# Patient Record
Sex: MALE | Race: WHITE | Employment: OTHER | URBAN - METROPOLITAN AREA
[De-identification: names, ages, dates, MRNs, and addresses within clinical notes are randomized per-mention and may not be internally consistent; named-entity substitution may affect disease eponyms.]

---

## 2021-01-26 ENCOUNTER — TRANSFERRED RECORDS (OUTPATIENT)
Dept: HEALTH INFORMATION MANAGEMENT | Facility: CLINIC | Age: 76
End: 2021-01-26

## 2021-02-04 ENCOUNTER — MEDICAL CORRESPONDENCE (OUTPATIENT)
Dept: HEALTH INFORMATION MANAGEMENT | Facility: CLINIC | Age: 76
End: 2021-02-04

## 2021-02-05 ENCOUNTER — REFERRAL (OUTPATIENT)
Dept: TRANSPLANT | Facility: CLINIC | Age: 76
End: 2021-02-05

## 2021-02-05 DIAGNOSIS — N18.6 END STAGE RENAL DISEASE (H): ICD-10-CM

## 2021-02-05 DIAGNOSIS — N18.6 ESRD (END STAGE RENAL DISEASE) (H): Primary | ICD-10-CM

## 2021-02-05 DIAGNOSIS — I25.10 CARDIOVASCULAR DISEASE: ICD-10-CM

## 2021-02-05 DIAGNOSIS — Z01.818 PRE-TRANSPLANT EVALUATION FOR KIDNEY TRANSPLANT: ICD-10-CM

## 2021-02-05 NOTE — LETTER
Mike Dunne  [address] [address]   1945              February 10, 2021                                                                                      MEDICAL RECORDS REQUEST    [facility] Records Request                      Facility: [facility]    Thank you for referring your patient to the [facility], in order to process the referral we will need the following information;    1. 2728 form   2. Immunizations records  3. Providers Progress notes, (last 3 note)      Please call our office at [phone] if you have any questions or concerns.                Please fax all paper records to [phone] within 3-5 business days.      Thank you,   The SOT Referral Intake Team     [facility]  Solid Organ Transplant Office  [address] [place] [address], [address] [address]

## 2021-02-05 NOTE — LETTER
February 11, 2021      Mike Dunne  [address] [facility] [address]          Dear Mike,    Thank you for your interest in the Transplant Center at [facility]. We look forward to being a part of your care team and assisting you through the transplant process.    As we discussed, your transplant coordinator is Lilian Ryder (Kidney).  You may call your coordinator at any time with questions or concerns.  Your first scheduled call will be on February 22, 2021.  If this needs to change, call [phone].    Please complete the following.    1. Fill out and return the enclosed forms    Authorization for Electronic Communication    Authorization to Discuss Protected Health Information    Authorization for Release of Protected Health Information    Authorization for Care Everywhere Release of Information    2. Sign up for:    [employer], access to your electronic medical record (see enclosed pamphlet)    [employer], a transplant education website    You can use these tools to learn more about your transplant, communicate with your care team, and track your medical details  Sincerely,  Solid Organ Transplant  [facility]  Cc: Jay Darden (PCP) Angela Joshi (Referring) Ramin Terry MD

## 2021-02-05 NOTE — Clinical Note
Hello! Please see orders for PKE. No save the date set but he'd like to come in on a Monday. Thank you!

## 2021-02-10 VITALS — BODY MASS INDEX: 22.9 KG/M2 | WEIGHT: 160 LBS | HEIGHT: 70 IN

## 2021-02-10 SDOH — HEALTH STABILITY: MENTAL HEALTH: HOW OFTEN DO YOU HAVE A DRINK CONTAINING ALCOHOL?: NOT ASKED

## 2021-02-10 SDOH — HEALTH STABILITY: MENTAL HEALTH: HOW OFTEN DO YOU HAVE 6 OR MORE DRINKS ON ONE OCCASION?: NOT ASKED

## 2021-02-10 SDOH — HEALTH STABILITY: MENTAL HEALTH: HOW MANY STANDARD DRINKS CONTAINING ALCOHOL DO YOU HAVE ON A TYPICAL DAY?: NOT ASKED

## 2021-02-10 ASSESSMENT — MIFFLIN-ST. JEOR: SCORE: 1467.01

## 2021-02-10 NOTE — TELEPHONE ENCOUNTER
PCP: Jay Darden   Referring Provider: Angela Shabazz  Referring Diagnosis: ESRD  Specialist: Hematologist Ramin Terry- Amyloidosis  Sees Cardiology at [facility]- no specific one per patient    Is patient under the age of 65? N  Is patient diabetic? N  Is patient on insulin? N  Was patient offered a pancreas transplant referral? N    Is patient in a group home/assisted living? n  Does patient have a guardian? n    Patient stated he was evaluated at [facility] and declined due to heart.    Referral intake process completed.  Patient is aware that after financial approval is received, medical records will be requested.   Patient confirmed for a callback from transplant coordinator on February 22, 2021. (within 2 weeks)  Tentative evaluation date TBD-not scheduled. (within 4 weeks)    Confirmed coordinator will discuss evaluation process in more detail at the time of their call.   Patient is aware of the need to arrange age appropriate cancer screening, vaccinations, and dental care.  Reminded patient to complete questionnaire, complete medical records release, and review packet prior to evaluation visit .  Assessed patient for special needs (ie--wheelchair, assistance, guardian, and ):  uses cane prn   Patient instructed to call [phone] with questions.     Patient gave verbal consent during intake call to obtain medical records and documents outside of [facility]:  yes

## 2021-02-22 NOTE — TELEPHONE ENCOUNTER
"Reviewed pt's chart for pre-kidney transplant evaluation planning. Pt lives in [place], [place]. Pt has AL amyloidosis. Patient reports a biopsy was completed of his kidney- no records found. Pt is on hemo dialysis; started in 2015. He tried PD but recently had the catheter removed. Pt is not diabetic. Other hx includes knee replacemement x2, hip replacement x1.  Heart hx is significant d/t cardiac involvement with AL amyloidosis. In 2018 he had a R and Left heart cath d/t EF of 30%, recovered to 60%. 2019- stent placement d/t severe CAD, follow-up angiogram showed mild-moderate CAD with patent stents. Underwent TAVR for severe aortic stenosis in 2020 and is also s/p ablation for SVT. Patient does not consider himself to have any cardiac issues and feels that he has recovered. Lung status no history of chronic infections or oxygen use.  BMI 25 on 1/25/2021. Dental: encouraged to stay up to date. Pt is not a  smoker, does not consume alcohol, and refrains from recreational drugs. Pt uses a cane for ambulation, and is hoping with increased exercise he will require it less. Noted to be \"pre-frail\".  Pt lives w/ wife and has her support following transplant. Patient was recently declined by [facility] due to high risk with cardiac history and co-morbidities.    I also introduced [employer] and asked pt to create an account and view pre-kidney transplant videos for review with me following evaluation. Informed pt they will hear from scheduling to arrange the evaluation. Smartset orders entered, chart routed to scheduling pool.       "

## 2021-03-10 NOTE — TELEPHONE ENCOUNTER
Spoke with Seun regarding PKE. He called and asked to be put on the kidney transplant wait list, and I reminded him that he would need to see providers and be approved. He did not remember that he scheduled PKE for next week, but stated he was available. RNCC reached out to scheduling team and they will mail a schedule to him ASAP. RNCC reviewed PKE with the patient and prepared them for a long day at the clinic. Patient was agreeable to this and would call with questions.

## 2021-03-11 ENCOUNTER — TELEPHONE (OUTPATIENT)
Dept: TRANSPLANT | Facility: CLINIC | Age: 76
End: 2021-03-11

## 2021-03-11 NOTE — TELEPHONE ENCOUNTER
Spoke with patient and confirmed upcoming PKE appointments 3/15/21 starting at 7:30 am. Patient instructed he may eat breakfast, take regularly scheduled medications and have 1 adult visitor accompany him. Patient denies Covid 19 symptoms and/or exposure within 14 days and will call to reschedule if that changes.  Patient stated understanding.  Patient given Transplant Office phone number number for further questions/concerns.

## 2021-03-13 NOTE — PROGRESS NOTES
[facility] Solid Organ Transplant  Outpatient MNT: Kidney Transplant Evaluation    Current BMI: 25.2 (HT 69 in,  lbs/77 kg)  BMI is within recommendation of <35 for kidney transplant    Frailty Assessment -- Frail (3/5)--exhaustion, reduced , slowness          Time Spent: 15 minutes  Visit Type: Initial   Referring Physician: Rosalio  Pt accompanied by: his wife, Shashi     History of previous txp: none   Dialysis: yes    Dialysis Info: HD 3/2015 9 am TTS  Protein supplement: yes, protein bar    Nutrition Assessment  Wife cooks at home.     Appetite: good/baseline    Vitamins, Supplements, Pertinent Meds: B complex, vit D, vit B12  Herbal Medicines/Supplements: OTC for mental acuity x 1 month (pt unsure of name)    Edema: yes    Weight hx: overall stable     Food Security: any concerns about having enough money to buy food or access to grocery stores? No     Diet Recall  Breakfast During HD- turkey with cheese s/w, protein bar; non-HD days: english muffin/toast/scrambled egg   Lunch Turkey s/w    Dinner Homemade soup (with LS broth); protein (chicken) + salad, some side (white rice/baked potato)   Snacks Salty snack- chips/popcorn    Beverages Water, coffee, Pedialyte (8-16 oz)   Alcohol None    Dining out 1x/week- turkey hoagie with lettuce      Physical Activity  None       Labs  2/18 K 4.9   No recent phos on file -- high per pt report     Nutrition Diagnosis  No nutrition diagnosis identified at this time     Nutrition Intervention  Nutrition education provided:  Discussed sodium intake (low sodium foods and drinks, seasoning food without salt and tips for low sodium diet).  Reviewed monitoring sodium for 2000 mg/day. Ask for LS turkey at the deli counter or compare prepackaged brands. Reviewed wnl K level, yet elevated phos per his report. Could be from added phosphates in turkey as well. Not on binder yet, but may start one pending lab trends.     We discussed considering PT to help with strength  and ambulation. He did find it helpful in the past, but it closed down with COVID so he had to stop going. Is open to going back.     Reviewed post txp diet guidelines in brief (will review in further detail post txp):  (1) Review of proper food safety measures d/t immunosuppressant therapy post-op and increased risk for food-borne illness    (2) Avoid the following post txp d/t risk for rejection, unknown effects on the organs, and/or potential interactions with immunosuppressants:  - Herbal, Chinese, holistic, chiropractic, natural, alternative medicines and supplements  - Detoxes and cleanses  - Weight loss pills  - Protein powders or other products with extracts or herbs (ie green tea extract)    (3) Med regimen and possible side effects    Patient Understanding: Pt verbalized understanding of education provided.  Expected Engagement: Good  Follow-Up Plans: PRN     Nutrition Goals  No nutrition goals identified at this time     Nadege Hernandez, RD, LD, CCTD

## 2021-03-15 ENCOUNTER — DOCUMENTATION ONLY (OUTPATIENT)
Dept: TRANSPLANT | Facility: CLINIC | Age: 76
End: 2021-03-15

## 2021-03-15 ENCOUNTER — ALLIED HEALTH/NURSE VISIT (OUTPATIENT)
Dept: TRANSPLANT | Facility: CLINIC | Age: 76
End: 2021-03-15
Attending: INTERNAL MEDICINE
Payer: MEDICARE

## 2021-03-15 VITALS
DIASTOLIC BLOOD PRESSURE: 87 MMHG | OXYGEN SATURATION: 95 % | WEIGHT: 170.64 LBS | SYSTOLIC BLOOD PRESSURE: 177 MMHG | BODY MASS INDEX: 25.27 KG/M2 | HEART RATE: 87 BPM | HEART RATE: 87 BPM | OXYGEN SATURATION: 95 % | HEIGHT: 69 IN | SYSTOLIC BLOOD PRESSURE: 177 MMHG | DIASTOLIC BLOOD PRESSURE: 87 MMHG | BODY MASS INDEX: 25.27 KG/M2 | HEIGHT: 69 IN | WEIGHT: 170.6 LBS

## 2021-03-15 DIAGNOSIS — I25.10 CARDIOVASCULAR DISEASE: ICD-10-CM

## 2021-03-15 DIAGNOSIS — N18.6 ESRD (END STAGE RENAL DISEASE) (H): ICD-10-CM

## 2021-03-15 DIAGNOSIS — Z95.2 S/P TAVR (TRANSCATHETER AORTIC VALVE REPLACEMENT): ICD-10-CM

## 2021-03-15 DIAGNOSIS — N18.6 END STAGE RENAL DISEASE (H): ICD-10-CM

## 2021-03-15 DIAGNOSIS — Z01.818 PRE-TRANSPLANT EVALUATION FOR KIDNEY TRANSPLANT: ICD-10-CM

## 2021-03-15 DIAGNOSIS — E85.81 LIGHT CHAIN (AL) AMYLOIDOSIS (H): Primary | ICD-10-CM

## 2021-03-15 DIAGNOSIS — I25.10 CORONARY ARTERY DISEASE INVOLVING NATIVE HEART WITHOUT ANGINA PECTORIS, UNSPECIFIED VESSEL OR LESION TYPE: ICD-10-CM

## 2021-03-15 DIAGNOSIS — I42.5 OTHER RESTRICTIVE CARDIOMYOPATHY (H): ICD-10-CM

## 2021-03-15 PROCEDURE — 99205 OFFICE O/P NEW HI 60 MIN: CPT

## 2021-03-15 PROCEDURE — 99207 PR SATISFY VISIT NUMBER: CPT | Performed by: TRANSPLANT SURGERY

## 2021-03-15 RX ORDER — WARFARIN SODIUM 1 MG/1
TABLET ORAL
COMMUNITY

## 2021-03-15 RX ORDER — CINACALCET 30 MG/1
30 TABLET, FILM COATED ORAL
COMMUNITY
Start: 2020-07-15

## 2021-03-15 RX ORDER — WARFARIN SODIUM 2 MG/1
2 TABLET ORAL
COMMUNITY
Start: 2020-07-02

## 2021-03-15 RX ORDER — METOPROLOL TARTRATE 100 MG
100 TABLET ORAL 2 TIMES DAILY
COMMUNITY

## 2021-03-15 RX ORDER — LORAZEPAM 1 MG/1
TABLET ORAL
COMMUNITY
Start: 2020-02-28

## 2021-03-15 RX ORDER — ROSUVASTATIN CALCIUM 20 MG/1
20 TABLET, COATED ORAL
COMMUNITY
Start: 2020-02-28

## 2021-03-15 RX ORDER — ACYCLOVIR 200 MG/1
200 CAPSULE ORAL 2 TIMES DAILY
COMMUNITY
Start: 2020-08-19

## 2021-03-15 RX ORDER — SENNA LEAF EXTRACT 176MG/5ML
SYRUP ORAL
COMMUNITY

## 2021-03-15 ASSESSMENT — MIFFLIN-ST. JEOR
SCORE: 1499.22
SCORE: 1499.63

## 2021-03-15 NOTE — PROGRESS NOTES
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 He has no potential living donors.     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 , hematologist suggested he may not be able to achieve deeper remission but would likely need lifelong daratumumab to maintain hematologic remission.Last Hem visit 2/2021    # Cardiac Risk: hx of CAD s/p PCI to LAD 2019 done due to positive Dobutamine stress echo with anterior, inferior, inferolateral ischemia 9/2020, LHC also showed multiple discrete lesions ~70% obstruction, repeat Dobutamine stress echo neg for ischemia 1/2021 yet with concerns for high CO-5.03L/min/m2  heart failure as CO unusually high with hx of amyloidosis, unclear if related to AVF, ef:60% improved 1/2021 from 35% in 1/2019   Last seen by [facility] 1/2021 for pre-kidney transplant evaluation, thought to be at low risk for ischemic events but high risk for acute decompensated heart failure    # Cardiomyopathy:  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 Last cardiology visit was in 1/2021 at [place], he was cleared to proceed with kidney transplant without further risk stratification. 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    # Frailty: 3/5 on frailty score, uses a cane, had multiple falls over the past year due to balance issues, will need PT    # BCC: follows with dermatology Q6 months, last visit    # Depression: seen 6/2020 by psych, not receiving counseling or  meds, appreciate social work input    # hx of shingles: last flare up ~8 months ago-facial without eye involvement-->acyclovir  on acyclovir for herpres ppx since SCT, had shingles x2; s/p shingrix vaccine per patient    # Health Maintenance: Colonoscopy: 2011 dev, due , PSA, and Dental: Up to date    Discussed the risks and benefits of a transplant, including the risk of surgery and immunosuppression medications.  Patient's overall evaluation will be discussed in the Transplant Program's regular meeting with a final  "recommendation on the patients suitability for transplant to be made at that time.    Evaluation:  Mike Dunne was seen in consultation at the request of Dr. Polo Santamaria for evaluation as a potential kidney transplant recipient.    Reason for Visit:  Mike Dunne is a 76 year old male with ESKD from AL amyloidosi, who presents for kidney transplant evaluation.    Of note, patient was removed from  donor kidney transplant wait-list at [place] 2021 due to concerns over reduced patient/graft survival given multiple co-morbidities. He is presenting today for a second opinion regarding candidacy for kidney transplant    History of Present Illness:  This is a 75 yo male with hx of ESKD 2/2 AL amyloidosis, on RRT since early  (initially PD later switched to HD due to recurrent peritonitis \"HD: TTS via [facility] AVF , , [facility] EDW-77kg) , restrictive cardiomyopathy likely due to amyloidosis, CAD s/p PCI to LAD , severe aortic stenosis s/p TAVR 2020, AVNRT s/p ablation 2020 presents for kidney transplant evaluation.   Mr. Dunne was initially noted to have abnormal renal function on routine labs in  with worsening Cr from b/l 0.9 to 1.-->2.3 as well as nephrotic range proteinuria up to 6.5 g. further evaluation showed abnormal SPEP with monoclonal IgG lambda at 10.2 g/dl and free lambda at 0.1  g/dL in the blood with elevated free kappa light chains at 26.4, free lambda at 416.8, and an abnormal kappa/lambda ratio at 0.06.   Bone marrow showed 5-10% lambda restricted plasma cells but was negative for amyloid.  He received chemotherapy (CyBorD: [id number]), s/p auto SCT in 2014 followed by repeat chemotherapy in  and again in  but he had no further response to chemoTx, so he was switched back to daratumumab since Aug.2018, BMA/Bx 2019 showed persistently +ve lambda chain restriction with +ve congo red stain on the vessel wall, SPEP/IF +small monoclonal " lambda  but K/L has normalized since 10/2020, most recent K/L:0.76 2/18/2021. , hematologist suggested he may not be able to achieve deeper remission but would likely need lifelong daratumumab to maintain hematologic remission.Last Hem visit 2/2021.   Cardiac evaluation included DSE which was +ve ischemia, subsequent LHC showed 70% LAD stenosis s/p ANIKET 2019, initial echo in 2019 showed ef:35% which improved up to 55% on last echo 2020 and 60% on recent DSE.    He reports excesseive fatigue, low energy, frequent falls over the last year (x3). He denies any chest pain, sob, but has significant LE swelling. He is tolerating HD ok though he reports inability to get to EDW due to severe leg cramps. He denies any episodes of hypotension requiring midodrine,and his BP has been running rather on the higher side lately partly volume related,      Sensitization: no PRBC         Kidney Disease Hx:        On HD since early 2015 then switched to PD up until 8/2020 when he developed recurrent peritonitis so switched back to iHD       Kidney Disease Dx: AL Amyloidosis       Biopsy Proven: Yes; 2013 (see below)         On Dialysis: Yes, Date initiated: early 2015       Primary Nephrologist:        H/o Kidney Stones: Yes; Ca oxalate per patient, passed spontaneously        H/o Recurrent/Frequent UTI: No No      Renal Pathology: 11/2013  Crescentic and  sclerosing glomerulonephritis   LM: cellular crescents half of glomeruli  IF: +++IgG, ++C3 atypical (both kappa and lambda 1-2+ mesangium, +strong 3+lambda along capillary loops & TBM, o extraglomerular deposits, crescentic pattern concerning for fibrillary GN but fibrils more suggestive of amyloid  EM: fibrils 11 nm diameter suggestive of amyloid vs fibrillary GN  [place] red stain: equivocal (+ve 1st then negative)      BMA/Bx 2013  5-10% lambda light chain restricted plasma cells         Diabetic Hx: None           Cardiac/Vascular Disease Risk Factors:         Cardiac Risk Factors: Hypertension, CKD and Age (Male > 55, Female > 65)       Known CAD: Yes; s/p PCI to LAD 2019        Known PAD/Caludication Symptoms: Yes; CT       Known Heart Failure: HFpEF       Arrhythmia: Yes; AVNRT s/p ablation       Pulmonary Hypertension: No       Valvular Disease: Yes; severe aortic stenosis s/p TAVR, mild MS       Other: Prolonged QT    [facility] 10/2019: mild-to-moderate coronary artery disease and patent LAD stents.    RHC 10/2019: a mean RA pressure of 3, PA pressure 38/12, pulmonary capillary wedge pressure 9, cardiac index 4.05 liters/minute per meter square    1/2019 Right heart catheterization showed a PCWP 31, PAP 60/29, RAP 14.    TTE 8/2020    Final Impressions  1. Status post 26 mm Hamm Nathaly 3 transcatheter aortic valve bioprosthesis implanted via a  transfemoral approach (29-JUN-2020).  2. Aortic valve prosthesis systolic mean Doppler gradient 10 mmHg.  Cusp thickness and  excursion appear normal.  Trivial periprosthetic regurgitation (1 o'clock on SAX view).  No  prosthetic regurgitation.  3. Normal left ventricular chamber size, calculated ejection fraction 58%.  No regional wall  motion abnormalities.  4. Mildly increased concentric left ventricular wall thickness.  Strain imaging findings show a  pattern consistent with cardiac involvement by systemic amyloidosis.  5. Global averaged left ventricular longitudinal peak systolic strain is at -13 % (normal =  more negative than -18%).  6. Indeterminate left ventricular diastolic function grade due to fusion of mitral inflow E-  and A- waves, as well as tissue Doppler e' and a' waves.  LV filling is A-wave predominant,  suggesting no more than mild elevation of LV diastolic pressure.  7. Mild mitral valve regurgitation.  8. Mild tricuspid valve regurgitation.  9. Estimated right ventricular systolic pressure 34 mmHg (systolic blood pressure 125 mmHg).  10. Normal right ventricular chamber size by visual estimate.  Normal  systolic function.  11. No pericardial effusion.  12. Normal inferior vena cava size with normal inspiratory collapse (>50%).    Dobutamine stress echo 1/2021  Final Impressions  1. REST IMAGES:  2. Left ventricular cardiac index 5.03 l/min/m^2 This is unusually high in a patient with known  amyloidosis. Consider the differential diagnosis of heart failure with high cardiac output.   Note that patient has right arm AV fistula.  3. Calcific mitral valve stenosis with a resting mean gradient of 8 mmHg at a heart rate of 90.  The gradient has increased compared to transthoracic study off 06/11/2020. Probably a  combination of increased cardiac index, higher heart rate and mild degree of impingement of the  Nathaly stent on the anterior mitral leaflet.  Visually, stenosis does not appear severe.  4. STRESS TEST:  5. Dobutamine stress echocardiogram negative for myocardial ischemia.  6. Ejection fraction response from 60 % at rest to 65 % at peak stress.  7. Left ventricular end-systolic volume decreased with stress.  8. No regional wall motion abnormalities with stress.    [facility] 11/2019  CORONARY DIAGNOSTIC SUMMARY  Coronary artery dominance is right.   The left main coronary artery is 20% obstructed by a discrete lesion.   The proximal left anterior descending artery is 30% obstructed by a discrete lesion.   The middle left anterior descending artery is 40% obstructed by a discrete lesion.   The proximal circumflex artery is 30% obstructed by a discrete lesion.   The proximal right coronary artery is 20% obstructed by a discrete lesion    CT a/p 6/2020    1. Scattered areas of mild to moderate peripheral arterial disease with minimal  to mild stenosis in the common femoral and bilateral common iliac arteries  bilaterally. Please see verified arterial measurements in QREADS.    2. There is new ill-defined soft tissue nodularity/stranding in the left upper  quadrant and peritoneal carcinomatosis needs to be excluded.  "Further evaluation  with peritoneal fluid cytology and/or percutaneous sampling should be  considered.         Viral Serology Status       CMV IgG Antibody: Unknown       EBV IgG Antibody: Unknown         Volume Status/Weight:        Volume status: Markedly hypervolemic       Weight:  Acceptable BMI       BMI: Body mass index is 25.19 kg/m .         Functional Capacity/Frailty:        Uses a cane to ambulate. Sedentary, balance issues with recurrent falls last year ~x3. Scored 3/5 on frailty assessment    COVID vaccinated 3/4    Fatigue/Decreased Energy: [] No [x] Yes    Chest Pain or SOB with Exertion: [] No [x] Yes    Significant Weight Change: [x] No [] Yes    Nausea, Vomiting or Diarrhea: [] No [] Yes Occasional nausea , diarrhea   Fever, Sweats or Chills:  [x] No [] Yes    Leg Swelling [] No [x] Yes        History of Cancer: BCC    Other Significant Medical Issues: None    Review of Systems:  A comprehensive review of systems was obtained and negative, except as noted in the HPI or PMH.    Past Medical History:   Medical record was reviewed and PMH was discussed with patient and noted below.  Past Medical History:   Diagnosis Date     Hypertension      Osteoarthritis     RIGHT HIP     Pain, hip     RIGHT       Past Social History:   Past Surgical History:   Procedure Laterality Date     APPENDECTOMY       ARTHROPLASTY HIP  3/21/2012    Procedure:ARTHROPLASTY HIP; Right Total Hip Arthroplasty; Surgeon:CHAYA ROBERTS; Location:[facility] OR     ARTHROPLASTY KNEE BILATERAL      2008, 2010     ARTHROSCOPY KNEE      RIGHT     CARDIAC SURGERY  2018    [facility]     FOOT SURGERY      LEFT FOOT ????CHEILECTOMY     TRANSPLANT  2017    stem cell transplant, [place]     VALVE REPLACEMENT  2020    AVR, [facility]     Personal history of bleeding or anesthesia problems: No    Family History:  No family history on file.   Neg FHx of kidney disease, +\"heart problems\" in father in his 70s, prostate Ca in " father    Personal History:   Social History     Socioeconomic History     Marital status:      Spouse name: Not on file     Number of children: Not on file     Years of education: Not on file     Highest education level: Not on file   Occupational History     Not on file   Social Needs     Financial resource strain: Not on file     Food insecurity     Worry: Not on file     Inability: Not on file     Transportation needs     Medical: Not on file     Non-medical: Not on file   Tobacco Use     Smoking status: Former Smoker     Packs/day: 0.50     Years: 25.00     Pack years: 12.50     Types: Cigarettes     Quit date:      Years since quittin.2     Smokeless tobacco: Never Used   Substance and Sexual Activity     Alcohol use: Not Currently     Drug use: No     Sexual activity: Not on file   Lifestyle     Physical activity     Days per week: Not on file     Minutes per session: Not on file     Stress: Not on file   Relationships     Social connections     Talks on phone: Not on file     Gets together: Not on file     Attends [employer]ism service: Not on file     Active member of club or organization: Not on file     Attends meetings of clubs or organizations: Not on file     Relationship status: Not on file     Intimate partner violence     Fear of current or ex partner: Not on file     Emotionally abused: Not on file     Physically abused: Not on file     Forced sexual activity: Not on file   Other Topics Concern     Parent/sibling w/ CABG, MI or angioplasty before 65F 55M? Not Asked   Social History Narrative     Not on file     Social Hx: x-smoker quit 30 yrs ago, 3- pyrs; no alcohol, no illicits    Allergies:  Allergies   Allergen Reactions     Bupropion      Other reaction(s): Muscle Aches/Weakness     Levofloxacin Other (See Comments)     Other reaction(s): Tendonitis  Achilles tendon and muscle problems  Other reaction(s): Tendinitis (disorder), Tendonitis  Achillis  "tendonitis  tendonitis  tendonitis  tendonitis  Achillis tendonitis       Nsaids      Other reaction(s): Renal  CKD     Oxycodone Nausea and Vomiting     Tamsulosin      Other reaction(s): Gastrointestinal, GI intolerance       Medications:  Current Outpatient Medications   Medication Sig     acetaminophen (TYLENOL) 325 MG tablet Take 1-2 tablets by mouth every 6 hours as needed.     acyclovir (ZOVIRAX) 200 MG capsule Take 200 mg by mouth 2 times daily     Aspirin Buf,CaCarb-MgCarb-MgO, 81 MG TABS Take 81 mg by mouth     cholecalciferol (VITAMIN D3) 25 mcg (1000 units) capsule Take 1,000 Units by mouth     cinacalcet (SENSIPAR) 30 MG tablet Take 30 mg by mouth     LORazepam (ATIVAN) 1 MG tablet TAKE 1 TABLET(1 MG) BY MOUTH AT BEDTIME AS NEEDED FOR ANXIETY     metoprolol tartrate (LOPRESSOR) 100 MG tablet Take 100 mg by mouth 2 times daily     rosuvastatin (CRESTOR) 20 MG tablet Take 20 mg by mouth     Senna 176 MG/5ML SYRP      vitamin B-12 (CYANOCOBALAMIN) 250 MCG tablet Take 250 mcg by mouth     warfarin ANTICOAGULANT (COUMADIN) 1 MG tablet      warfarin ANTICOAGULANT (COUMADIN) 2 MG tablet Take 2 mg by mouth     Ascorbic Acid (VITAMIN C CR PO) Take  by mouth daily as needed.     B Complex Vitamins (VITAMIN B COMPLEX PO) Take  by mouth daily.     Glucosamine-Chondroitin (GLUCOSAMINE CHONDR COMPLEX PO) Take  by mouth daily.     lisinopril (PRINIVIL,ZESTRIL) 10 MG tablet Take 10 mg by mouth daily.     VITAMIN E PO Take  by mouth daily as needed.     No current facility-administered medications for this visit.        Vitals:  BP (!) 177/87   Pulse 87   Ht 1.753 m (5' 9\")   Wt 77.4 kg (170 lb 9.6 oz)   SpO2 95%   BMI 25.19 kg/m      Exam:  GENERAL APPEARANCE: alert and no distress  HENT: mouth without ulcers or lesions  LYMPHATICS: no cervical or supraclavicular nodes  RESP: lungs clear to auscultation - no rales, rhonchi or wheezes  CV: regular rhythm, normal rate, no rub, no murmur  EDEMA: +++woody LE edema " bilaterally  ABDOMEN: soft, nondistended, nontender, bowel sounds normal  MS: extremities normal - no gross deformities noted, no evidence of inflammation in joints, no muscle tenderness  SKIN: no rash  Access RUE AVF +thrill/bruit    Results:   No results found for this or any previous visit (from the past 336 hour(s)).                  BMA/Bx 1/2020      DIAGNOSIS:    Peripheral blood, bone marrow aspirate and biopsy, iliac crest:          1.  Plasma cell proliferative disorder with <5% lambda light    chain-restricted plasma cells.        2.  Bone marrow biopsy is negative for amyloid deposition.        3.  Normocellular bone marrow with morphologically normal trilineage    hematopoiesis.

## 2021-03-15 NOTE — PROGRESS NOTES
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  Eval for kidney transplant.    I/P  Marginal kidney recipient.  Quite frail and with disease with significant systemic manifestations.  He has a long way to go to become a viable kidney recipient.  1. ESRD: he says dx is amyloid, no known cause.  Needs to be confirmed.  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  PD removed for infx and now with HD/fistula.  Says he has no live donor candidates.  2. Frailty: has had 2 total knee repl and R hip replacement.  He is limited in mobility and presents significant surgical risk.  Very limited activities.  He should undergo PT and nutritional plan to increase performance level.  3. Cardiac: has had cardiac stenting and atrial ablation for SVT at [place].  Currently anticoagulated on coumadin and ASA.  Also had TAVR last year.  Recent stress test negative,but has significant cardiac risk.  4. HTN: one drug.  Says control ok.  5. Hx of skin cancer.   Get records, not easily found in careeverywhere.    I spent > 60 min reviewing chart, searching for records, reviewing films, examining, talking and counseling patient and his wife.      77 yo male with ESRD: amyloid vs. Crescentic GN.  I see no clear dx that RF is secondary to amyloid, although he has a significant eval from [place] for amyloid.  Has a significant cardiac history and is quite frail after bilat knee replacements and hip.  Prior to candidacy would need significant improvements.  He also has a significant cardiac history with coronary stent, TAVR and atrial ablation for SVT. Needs thorough cardiac assessment of coronaries, valve (MS as well as TAVR) and rhythm.  His EF is good at [place] (>60%).    Only cancer has been skin (SCCa?, maybe basal cell.  Cant find path records.  Need to obtain.    Urine: makes about a cup/day.  No problem with voiding by his account.  No infections, hematuria, stones, tumors.  Arterial: no claudication or foot ulcers  Venous: no DVT, unilateral  "edema.    BP (!) 177/87   Pulse 87   Ht 1.753 m (5' 9.02\")   Wt 77.4 kg (170 lb 10.2 oz)   SpO2 95%   BMI 25.19 kg/m    Alert, but limited mobility male.  Breathing; unlabored.  Abd soft, nontender  Skin ok for incision.  Groin pulses 2+.  Ext: 3/4+ edema        I had a long discussion with the patient regarding kidney transplantation which included but was not limited to the following points:    Kidney transplant evaluation process to assess whether (s)he can safely undergo a kidney transplant and take long-term immunosuppression.    The selection committee process was discussed.  The federal rules for cadaveric waiting list and blood type matching of the organ. The availability of living-related donor transplantation.  The types of donors: brain death donors, non-heart beating donors and living donor grafts  Extended criteria  Donors and the increased  risk of primary non-function using these extended criteria donors  [facility] high risk donors, risk of donor transmitted infections and donor transmitted malignancy  The kidney transplant operation and the associated risks and technical complications which can include intraoperative death, post operative death, primary non-function, bleeding requiring re-operations, vascular and ureteral complications, bowel perforations, and intra abdominal abscess. Some of these complications may require a second operation.  The postoperative course and risk of postoperative complications which can include sepsis, MI, stroke, brain injury, pneumonia, pleural effusions, and renal dysfunction.  The current 1 year and 5 year graft and patient survivals.  The need for life long immunosuppressive therapy and the side effects of these medications, including the possibility of toxicity, opportunistic infections, risk of cancer including lymphoma, and the possibility of rejection even if the patient is taking the medication exactly as prescribed.  The need for compliance with " medications and follow-up visits in the clinic and thereafter.  The patient and family understand these risks and wish to proceed to transplantation

## 2021-03-15 NOTE — LETTER
3/15/2021         RE: Mike Dunne  [address] [facility] [id number]        Dear Colleague,    Thank you for referring your patient, Mike Dunne, to the [facility]. Please see a copy of my visit note below.    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  Eval for kidney transplant.    I/P  Marginal kidney recipient.  Quite frail and with disease with significant systemic manifestations.  He has a long way to go to become a viable kidney recipient.  1. ESRD: he says dx is amyloid, no known cause.  Needs to be confirmed.  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  PD removed for infx and now with HD/fistula.  Says he has no live donor candidates.  2. Frailty: has had 2 total knee repl and R hip replacement.  He is limited in mobility and presents significant surgical risk.  Very limited activities.  He should undergo PT and nutritional plan to increase performance level.  3. Cardiac: has had cardiac stenting and atrial ablation for SVT at [facility].  Currently anticoagulated on coumadin and ASA.  Also had TAVR last year.  Recent stress test negative,but has significant cardiac risk.  4. HTN: one drug.  Says control ok.  5. Hx of skin cancer.   Get records, not easily found in careeverywhere.    I spent > 60 min reviewing chart, searching for records, reviewing films, examining, talking and counseling patient and his wife.      77 yo male with ESRD: amyloid vs. Crescentic GN.  I see no clear dx that RF is secondary to amyloid, although he has a significant eval from [facility] for amyloid.  Has a significant cardiac history and is quite frail after bilat knee replacements and hip.  Prior to candidacy would need significant improvements.  He also has a significant cardiac history with coronary stent, TAVR and atrial ablation for SVT. Needs thorough cardiac assessment of coronaries, valve (MS as well as TAVR) and rhythm.  His EF is good at [facility] (>60%).    Only cancer has been  "skin (SCCa?, maybe basal cell.  Cant find path records.  Need to obtain.    Urine: makes about a cup/day.  No problem with voiding by his account.  No infections, hematuria, stones, tumors.  Arterial: no claudication or foot ulcers  Venous: no DVT, unilateral edema.    BP (!) 177/87   Pulse 87   Ht 1.753 m (5' 9.02\")   Wt 77.4 kg (170 lb 10.2 oz)   SpO2 95%   BMI 25.19 kg/m    Alert, but limited mobility male.  Breathing; unlabored.  Abd soft, nontender  Skin ok for incision.  Groin pulses 2+.  Ext: 3/4+ edema        I had a long discussion with the patient regarding kidney transplantation which included but was not limited to the following points:    Kidney transplant evaluation process to assess whether (s)he can safely undergo a kidney transplant and take long-term immunosuppression.    The selection committee process was discussed.  The federal rules for cadaveric waiting list and blood type matching of the organ. The availability of living-related donor transplantation.  The types of donors: brain death donors, non-heart beating donors and living donor grafts  Extended criteria  Donors and the increased  risk of primary non-function using these extended criteria donors  [facility] high risk donors, risk of donor transmitted infections and donor transmitted malignancy  The kidney transplant operation and the associated risks and technical complications which can include intraoperative death, post operative death, primary non-function, bleeding requiring re-operations, vascular and ureteral complications, bowel perforations, and intra abdominal abscess. Some of these complications may require a second operation.  The postoperative course and risk of postoperative complications which can include sepsis, MI, stroke, brain injury, pneumonia, pleural effusions, and renal dysfunction.  The current 1 year and 5 year graft and patient survivals.  The need for life long immunosuppressive therapy and the side effects of " these medications, including the possibility of toxicity, opportunistic infections, risk of cancer including lymphoma, and the possibility of rejection even if the patient is taking the medication exactly as prescribed.  The need for compliance with medications and follow-up visits in the clinic and thereafter.  The patient and family understand these risks and wish to proceed to transplantation          Again, thank you for allowing me to participate in the care of your patient.        Sincerely,        EUGENE

## 2021-03-15 NOTE — NURSING NOTE
Chief Complaint   Patient presents with     Consult     EDEE Ray Sepulveda on 3/15/2021 at 8:32 AM

## 2021-03-15 NOTE — PROGRESS NOTES
Psychosocial Assessment For Kidney Transplantation  Patient Name/ Age: Mike Dunne 76 year old   Medical Record #: [id number]  Duration of Interview: 30 min  Process:   Face-to-Face Interview                (counseling < 50%)   Present at Appointment: Mike and his wife Shashi        : ISAIAS Murphy LICSW Date:  March 15, 2021        Type of transplant: Kidney    Donor type:      Cadaver   Prior Transplants:    No Status of Transplant: N/A           Current Living Situation    Location:   [address] [place] [id number]  With Whom: lives with their spouse       Family/ Social Support:    Mike reported he has four adult children: Alondra (lives in [place], [place]), Sandeep ([place]), Chemo ([place]), and Ryan ([place]). He has one brother, Sandeep, who lives in [place], [place]. He reported he and his brother are not that close.  available, helpful   Committed Relationship:Mike is  to Shashi     Stable/Supportive   Other Supports: Friends   available with reservation        Activities/ Functional Ability    Current Level: ambulatory, cane, visually impaired (glasses) and independent with ADL's     Transportation drives self       Vocational/Employment/Financial     Employment   retired   Job Description   Mike reported he is retired. He previously worked as a microbiologist at . His wife is retired as well.      Income   SS residential and Pension   Insurance      At this time, patient can afford medication costs:  Yes  Medicare, [employer] Supplement with Part D           Medical Status    Current Mode of Treatment for ESRD Dialysis since 2015   Complications None       Behavioral    Tobacco Use No Chemical Dependency No   Mike denied any tobacco use.  Mike denied alcohol, substance use, or chemical dependency treatment.      Psychiatric Impairment Yes   Mike reported he has been diagnosed with depression. He reported he previously has taken medication for depression but  no longer takes medication as he is able to manage it well on his own. He also reported he has a history of anxiety. Mike denied any other mental health concerns at this time.     Reading Ability: Good  Education Level: Bachelors Degree Recent Legal History No      Coping Style/Strategies: Take a nap       Ability to Adhere to Complex Medical Regime: Yes     Adherence History: Mike reported he follows his physician's recommendations, takes his medications as prescribed and attends his appointments as scheduled.         Education  _X_ Medicare  _X_ Rehabilitation  _X_ Donor issues  _X_ Community resources  _X_ Post discharge housing  _X_ Financial resources  _X_ Medical insurance options  _X_ Psych adjustment  _X_ Family adjustment  _X_ Health Care Directive Provided Education   Psychosocial Risks of Transplant Reviewed and Discussed:  _X_ Increased stress related to emotional,            family, social, employment or financial           situation  _X_ Effect on work and/or disability benefits  _X_ Effect on future health and life           insurance  _X_ Transplant outcome expectations may           not be met  _X_ Mental Health Risks: anxiety,           depression, PTSD, guilt, grief and           chronic fatigue     Notable Items:   None noted.       Final Evaluation/Assessment   Patient seemed to process information well. Appeared well informed, motivated and able to follow post transplant requirements. Behavior was appropriate during interview. Has adequate income and insurance coverage. Adequate social support. No major contraindications noted for transplant.  At this time patient appears to understand the risks and benefits of transplant.      Recommendation  Acceptable    Selection Criteria Met:  Plan for support Yes   Chemical Dependence Yes   Smoking Yes   Mental Health Yes   Adequate Finances Yes    Signature: ISAIAS Murphy LICSW   Title: Clinical

## 2021-03-15 NOTE — PROGRESS NOTES
Kidney Transplant Referral - 2/5/2021        Summary    Team s concerns/comments:   1) Amyloidosis kidney/heart  2) Severe aortic stenosis  3) Mitral stenosis  4) Lung nodules  5) PAD assessment  6) Frailty-3/5   7) Hx shingles  8) Health maintenance    Candidacy category: RED    Action/Plan: Patient saw providers only. Lab, EKG, Echo, CXR canceled      Expected Selection Meeting Discussion: 3/24/21

## 2021-03-15 NOTE — LETTER
3/15/2021         RE: Mike Dunne  [address]  [place] [id number]        Dear Colleague,    Thank you for referring your patient, Mike Dunne, to the [facility]. Please see a copy of my visit note below.    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 He has no potential living donors.     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 , hematologist suggested he may not be able to achieve deeper remission but would likely need lifelong daratumumab to maintain hematologic remission.Last Hem visit 2/2021    # Cardiac Risk: hx of CAD s/p PCI to LAD 2019 done due to positive Dobutamine stress echo with anterior, inferior, inferolateral ischemia 9/2020, [facility] also showed multiple discrete lesions ~70% obstruction, repeat Dobutamine stress echo neg for ischemia 1/2021 yet with concerns for high CO-5.03L/min/m2  heart failure as CO unusually high with hx of amyloidosis,  unclear if related to AVF, ef:60% improved 1/2021 from 35% in 1/2019   Last seen by cards 1/2021 for pre-kidney transplant evaluation, thought to be at low risk for ischemic events but high risk for acute decompensated heart failure    # Cardiomyopathy: 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 Last cardiology visit was in 1/2021 at [place], he was cleared to proceed with kidney transplant without further risk stratification. 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    # Frailty: 3/5 on frailty score, uses a cane, had multiple falls over the past year due to balance issues, will need PT    # BCC: follows with dermatology Q6 months, last visit    # Depression: seen 6/2020 by psych, not receiving counseling or  meds, appreciate social work input    # hx of shingles: last flare up ~8 months ago-facial without eye involvement-->acyclovir  on acyclovir for herpres ppx since SCT, had shingles x2; s/p shingrix vaccine per patient    # Health Maintenance: Colonoscopy: 2011 dev, due , PSA,  "and Dental: Up to date    Discussed the risks and benefits of a transplant, including the risk of surgery and immunosuppression medications.  Patient's overall evaluation will be discussed in the Transplant Program's regular meeting with a final recommendation on the patients suitability for transplant to be made at that time.    Evaluation:  Mike Dunne was seen in consultation at the request of Dr. Polo Santamaria for evaluation as a potential kidney transplant recipient.    Reason for Visit:  Mike Dunne is a 76 year old male with ESKD from AL amyloidosi, who presents for kidney transplant evaluation.    Of note, patient was removed from  donor kidney transplant wait-list at [place] 2021 due to concerns over reduced patient/graft survival given multiple co-morbidities. He is presenting today for a second opinion regarding candidacy for kidney transplant    History of Present Illness:  This is a 75 yo male with hx of ESKD 2/2 AL amyloidosis, on RRT since early  (initially PD later switched to HD due to recurrent peritonitis \"HD: TTS via [facility] AVF , , [facility]. EDW-77kg) , restrictive cardiomyopathy likely due to amyloidosis, CAD s/p PCI to LAD , severe aortic stenosis s/p TAVR 2020, AVNRT s/p ablation 2020 presents for kidney transplant evaluation.   Mr. Dunne was initially noted to have abnormal renal function on routine labs in  with worsening Cr from b/l 0.9 to 1.-->2.3 as well as nephrotic range proteinuria up to 6.5 g. further evaluation showed abnormal SPEP with monoclonal IgG lambda at 10.2 g/dl and free lambda at 0.1  g/dL in the blood with elevated free kappa light chains at 26.4, free lambda at 416.8, and an abnormal kappa/lambda ratio at 0.06.   Bone marrow showed 5-10% lambda restricted plasma cells but was negative for amyloid.  He received chemotherapy (CyBorD: [id number]), s/p auto SCT in 2014 followed by repeat chemotherapy in  and again " in 2018 but he had no further response to chemoTx, so he was switched back to daratumumab since Aug.2018, BMA/Bx 1/2019 showed persistently +ve lambda chain restriction with +ve congo red stain on the vessel wall, SPEP/IF +small monoclonal lambda  but K/L has normalized since 10/2020, most recent K/L:0.76 2/18/2021. , hematologist suggested he may not be able to achieve deeper remission but would likely need lifelong daratumumab to maintain hematologic remission.Last Hem visit 2/2021.   Cardiac evaluation included DSE which was +ve ischemia, subsequent LHC showed 70% LAD stenosis s/p ANIKET 2019, initial echo in 2019 showed ef:35% which improved up to 55% on last echo 2020 and 60% on recent DSE.    He reports excesseive fatigue, low energy, frequent falls over the last year (x3). He denies any chest pain, sob, but has significant LE swelling. He is tolerating HD ok though he reports inability to get to EDW due to severe leg cramps. He denies any episodes of hypotension requiring midodrine,and his BP has been running rather on the higher side lately partly volume related,      Sensitization: no PRBC         Kidney Disease Hx:        On HD since early 2015 then switched to PD up until 8/2020 when he developed recurrent peritonitis so switched back to iHD       Kidney Disease Dx: AL Amyloidosis       Biopsy Proven: Yes; 2013 (see below)         On Dialysis: Yes, Date initiated: early 2015       Primary Nephrologist:        H/o Kidney Stones: Yes; Ca oxalate per patient, passed spontaneously        H/o Recurrent/Frequent UTI: No No      Renal Pathology: 11/2013  Crescentic and  sclerosing glomerulonephritis   LM: cellular crescents half of glomeruli  IF: +++IgG, ++C3 atypical (both kappa and lambda 1-2+ mesangium, +strong 3+lambda along capillary loops & TBM, o extraglomerular deposits, crescentic pattern concerning for fibrillary GN but fibrils more suggestive of amyloid  EM: fibrils 11 nm diameter  suggestive of amyloid vs fibrillary GN  Congo red stain: equivocal (+ve 1st then negative)      BMA/Bx 2013  5-10% lambda light chain restricted plasma cells         Diabetic Hx: None           Cardiac/Vascular Disease Risk Factors:        Cardiac Risk Factors: Hypertension, CKD and Age (Male > 55, Female > 65)       Known CAD: Yes; s/p PCI to LAD 2019        Known PAD/Caludication Symptoms: Yes; CT       Known Heart Failure: HFpEF       Arrhythmia: Yes; AVNRT s/p ablation       Pulmonary Hypertension: No       Valvular Disease: Yes; severe aortic stenosis s/p TAVR, mild MS       Other: Prolonged QT    LHC 10/2019: mild-to-moderate coronary artery disease and patent LAD stents.    RHC 10/2019: a mean RA pressure of 3, PA pressure 38/12, pulmonary capillary wedge pressure 9, cardiac index 4.05 liters/minute per meter square    1/2019 Right heart catheterization showed a PCWP 31, PAP 60/29, RAP 14.    TTE 8/2020    Final Impressions  1. Status post 26 mm Hamm Nathaly 3 transcatheter aortic valve bioprosthesis implanted via a  transfemoral approach (29-JUN-2020).  2. Aortic valve prosthesis systolic mean Doppler gradient 10 mmHg.  Cusp thickness and  excursion appear normal.  Trivial periprosthetic regurgitation (1 o'clock on SAX view).  No  prosthetic regurgitation.  3. Normal left ventricular chamber size, calculated ejection fraction 58%.  No regional wall  motion abnormalities.  4. Mildly increased concentric left ventricular wall thickness.  Strain imaging findings show a  pattern consistent with cardiac involvement by systemic amyloidosis.  5. Global averaged left ventricular longitudinal peak systolic strain is at -13 % (normal =  more negative than -18%).  6. Indeterminate left ventricular diastolic function grade due to fusion of mitral inflow E-  and A- waves, as well as tissue Doppler e' and a' waves.  LV filling is A-wave predominant,  suggesting no more than mild elevation of LV diastolic pressure.  7.  Mild mitral valve regurgitation.  8. Mild tricuspid valve regurgitation.  9. Estimated right ventricular systolic pressure 34 mmHg (systolic blood pressure 125 mmHg).  10. Normal right ventricular chamber size by visual estimate.  Normal systolic function.  11. No pericardial effusion.  12. Normal inferior vena cava size with normal inspiratory collapse (>50%).    Dobutamine stress echo 1/2021  Final Impressions  1. REST IMAGES:  2. Left ventricular cardiac index 5.03 l/min/m^2 This is unusually high in a patient with known  amyloidosis. Consider the differential diagnosis of heart failure with high cardiac output.   Note that patient has right arm AV fistula.  3. Calcific mitral valve stenosis with a resting mean gradient of 8 mmHg at a heart rate of 90.  The gradient has increased compared to transthoracic study off 06/11/2020. Probably a  combination of increased cardiac index, higher heart rate and mild degree of impingement of the  Nathaly stent on the anterior mitral leaflet.  Visually, stenosis does not appear severe.  4. STRESS TEST:  5. Dobutamine stress echocardiogram negative for myocardial ischemia.  6. Ejection fraction response from 60 % at rest to 65 % at peak stress.  7. Left ventricular end-systolic volume decreased with stress.  8. No regional wall motion abnormalities with stress.    [facility] 11/2019  CORONARY DIAGNOSTIC SUMMARY  Coronary artery dominance is right.   The left main coronary artery is 20% obstructed by a discrete lesion.   The proximal left anterior descending artery is 30% obstructed by a discrete lesion.   The middle left anterior descending artery is 40% obstructed by a discrete lesion.   The proximal circumflex artery is 30% obstructed by a discrete lesion.   The proximal right coronary artery is 20% obstructed by a discrete lesion    CT a/p 6/2020    1. Scattered areas of mild to moderate peripheral arterial disease with minimal  to mild stenosis in the common femoral and bilateral  common iliac arteries  bilaterally. Please see verified arterial measurements in QREADS.    2. There is new ill-defined soft tissue nodularity/stranding in the left upper  quadrant and peritoneal carcinomatosis needs to be excluded. Further evaluation  with peritoneal fluid cytology and/or percutaneous sampling should be  considered.         Viral Serology Status       CMV IgG Antibody: Unknown       EBV IgG Antibody: Unknown         Volume Status/Weight:        Volume status: Markedly hypervolemic       Weight:  Acceptable BMI       BMI: Body mass index is 25.19 kg/m .         Functional Capacity/Frailty:        Uses a cane to ambulate. Sedentary, balance issues with recurrent falls last year ~x3. Scored 3/5 on frailty assessment    COVID vaccinated 3/4    Fatigue/Decreased Energy: [] No [x] Yes    Chest Pain or SOB with Exertion: [] No [x] Yes    Significant Weight Change: [x] No [] Yes    Nausea, Vomiting or Diarrhea: [] No [] Yes Occasional nausea , diarrhea   Fever, Sweats or Chills:  [x] No [] Yes    Leg Swelling [] No [x] Yes        History of Cancer: BCC    Other Significant Medical Issues: None    Review of Systems:  A comprehensive review of systems was obtained and negative, except as noted in the HPI or PMH.    Past Medical History:   Medical record was reviewed and PMH was discussed with patient and noted below.  Past Medical History:   Diagnosis Date     Hypertension      Osteoarthritis     RIGHT HIP     Pain, hip     RIGHT       Past Social History:   Past Surgical History:   Procedure Laterality Date     APPENDECTOMY       ARTHROPLASTY HIP  3/21/2012    Procedure:ARTHROPLASTY HIP; Right Total Hip Arthroplasty; Surgeon:CHAYA ROBERTS; Location:[facility] OR     ARTHROPLASTY KNEE BILATERAL      2008, 2010     ARTHROSCOPY KNEE      RIGHT     CARDIAC SURGERY  2018    [facility], [facility]     FOOT SURGERY      LEFT FOOT ????CHEILECTOMY     TRANSPLANT  2017    stem cell transplant, [place]     VALVE  "REPLACEMENT  2020    AVR, [facility]     Personal history of bleeding or anesthesia problems: No    Family History:  No family history on file.   Neg FHx of kidney disease, +\"heart problems\" in father in his 70s, prostate Ca in father    Personal History:   Social History     Socioeconomic History     Marital status:      Spouse name: Not on file     Number of children: Not on file     Years of education: Not on file     Highest education level: Not on file   Occupational History     Not on file   Social Needs     Financial resource strain: Not on file     Food insecurity     Worry: Not on file     Inability: Not on file     Transportation needs     Medical: Not on file     Non-medical: Not on file   Tobacco Use     Smoking status: Former Smoker     Packs/day: 0.50     Years: 25.00     Pack years: 12.50     Types: Cigarettes     Quit date:      Years since quittin.2     Smokeless tobacco: Never Used   Substance and Sexual Activity     Alcohol use: Not Currently     Drug use: No     Sexual activity: Not on file   Lifestyle     Physical activity     Days per week: Not on file     Minutes per session: Not on file     Stress: Not on file   Relationships     Social connections     Talks on phone: Not on file     Gets together: Not on file     Attends Denominational service: Not on file     Active member of club or organization: Not on file     Attends meetings of clubs or organizations: Not on file     Relationship status: Not on file     Intimate partner violence     Fear of current or ex partner: Not on file     Emotionally abused: Not on file     Physically abused: Not on file     Forced sexual activity: Not on file   Other Topics Concern     Parent/sibling w/ CABG, MI or angioplasty before 65F 55M? Not Asked   Social History Narrative     Not on file     Social Hx: x-smoker quit 30 yrs ago, 3- pyrs; no alcohol, no illicits    Allergies:  Allergies   Allergen Reactions     Bupropion      Other " "reaction(s): Muscle Aches/Weakness     Levofloxacin Other (See Comments)     Other reaction(s): Tendonitis  Achilles tendon and muscle problems  Other reaction(s): Tendinitis (disorder), Tendonitis  Achillis tendonitis  tendonitis  tendonitis  tendonitis  Achillis tendonitis       Nsaids      Other reaction(s): Renal  CKD     Oxycodone Nausea and Vomiting     Tamsulosin      Other reaction(s): Gastrointestinal, GI intolerance       Medications:  Current Outpatient Medications   Medication Sig     acetaminophen (TYLENOL) 325 MG tablet Take 1-2 tablets by mouth every 6 hours as needed.     acyclovir (ZOVIRAX) 200 MG capsule Take 200 mg by mouth 2 times daily     Aspirin Buf,CaCarb-MgCarb-MgO, 81 MG TABS Take 81 mg by mouth     cholecalciferol (VITAMIN D3) 25 mcg (1000 units) capsule Take 1,000 Units by mouth     cinacalcet (SENSIPAR) 30 MG tablet Take 30 mg by mouth     LORazepam (ATIVAN) 1 MG tablet TAKE 1 TABLET(1 MG) BY MOUTH AT BEDTIME AS NEEDED FOR ANXIETY     metoprolol tartrate (LOPRESSOR) 100 MG tablet Take 100 mg by mouth 2 times daily     rosuvastatin (CRESTOR) 20 MG tablet Take 20 mg by mouth     Senna 176 MG/5ML SYRP      vitamin B-12 (CYANOCOBALAMIN) 250 MCG tablet Take 250 mcg by mouth     warfarin ANTICOAGULANT (COUMADIN) 1 MG tablet      warfarin ANTICOAGULANT (COUMADIN) 2 MG tablet Take 2 mg by mouth     Ascorbic Acid (VITAMIN C CR PO) Take  by mouth daily as needed.     B Complex Vitamins (VITAMIN B COMPLEX PO) Take  by mouth daily.     Glucosamine-Chondroitin (GLUCOSAMINE CHONDR COMPLEX PO) Take  by mouth daily.     lisinopril (PRINIVIL,ZESTRIL) 10 MG tablet Take 10 mg by mouth daily.     VITAMIN E PO Take  by mouth daily as needed.     No current facility-administered medications for this visit.        Vitals:  BP (!) 177/87   Pulse 87   Ht 1.753 m (5' 9\")   Wt 77.4 kg (170 lb 9.6 oz)   SpO2 95%   BMI 25.19 kg/m      Exam:  GENERAL APPEARANCE: alert and no distress  HENT: mouth without ulcers " or lesions  LYMPHATICS: no cervical or supraclavicular nodes  RESP: lungs clear to auscultation - no rales, rhonchi or wheezes  CV: regular rhythm, normal rate, no rub, no murmur  EDEMA: +++woody LE edema bilaterally  ABDOMEN: soft, nondistended, nontender, bowel sounds normal  MS: extremities normal - no gross deformities noted, no evidence of inflammation in joints, no muscle tenderness  SKIN: no rash  Access RUE AVF +thrill/bruit    Results:   No results found for this or any previous visit (from the past 336 hour(s)).                  BMA/Bx 1/2020      DIAGNOSIS:    Peripheral blood, bone marrow aspirate and biopsy, iliac crest:          1.  Plasma cell proliferative disorder with <5% lambda light    chain-restricted plasma cells.        2.  Bone marrow biopsy is negative for amyloid deposition.        3.  Normocellular bone marrow with morphologically normal trilineage    hematopoiesis.           Again, thank you for allowing me to participate in the care of your patient.        Sincerely,        EUGENE

## 2021-04-02 ENCOUNTER — COMMITTEE REVIEW (OUTPATIENT)
Dept: TRANSPLANT | Facility: CLINIC | Age: 76
End: 2021-04-02

## 2021-04-02 NOTE — COMMITTEE REVIEW
Abdominal Committee Review Note     Evaluation Date:   Committee Review Date: 3/24/2021    Organ being evaluated for: Kidney    Transplant Phase: Referral  Transplant Status: Active    Transplant Coordinator: Lilian Ryder  Transplant Surgeon:       Referring Physician: Angela Shabazz    Primary Diagnosis:   Secondary Diagnosis:     Committee Review Members:  Nephrology [place] Ken MD, Kyree Molina MD, Rc Tse MD   Pharmacy Julia Yuen, [facility]    - Clinical Crystal Oliva, [facility], Yennifer Baird, [facility]   Transplant Rina Sampson PA-C, Devika Jules, JAYA, Brenda Sifuentes, RN, Agustina Suazo, RN, Mac Novak, JAYA, Maria Luisa Alberts RN, Claire Parker MD, Lilian Ryder, JAYA, Arcelia Eason RN   Transplant Surgery Vito Hood MD, Claire Parker MD       Transplant Eligibility: Irreversible chronic kidney disease treated w/dialysis or expected need for dialysis    Committee Review Decision: Declined    Relative Contraindications:     Absolute Contraindications: Inability to tolerate surgery    Committee Chair Claire Parker MD verbally attested to the committee's decision.    Committee Discussion Details: Reviewed pt's medical status and evaluation results to date.  Pt is determined to be a poor candidate for kidny transplant. Dr. Parker does not recommend transplant due to significant risks: cardiac, frailty, age. Patient will be declined.

## 2021-04-05 ENCOUNTER — TELEPHONE (OUTPATIENT)
Dept: TRANSPLANT | Facility: CLINIC | Age: 76
End: 2021-04-05

## 2021-04-05 NOTE — TELEPHONE ENCOUNTER
Contacted patient to review outcome of selection committee meeting (See selection committee encounter).   Explained to patient that he/she needs to complete all components of the evaluation to be eligible for active status on the waiting list or to proceed with a live donor kidney transplant.   Reviewed next steps based on outcomes:   Patient will not be listed because they are not a candidate-will receive:     -A letter indicating why they did not meet criteria for transplant at [facility].  Confirmed that patient has contact information for additional questions or concerns.

## 2021-04-05 NOTE — LETTER
April 5, 2021    Mike Dunne  [address] [facility] [address]      Dear Mr. Dunne,   The purpose of this letter is to let you know that on 3/24/2021 the [facility] Multi-Disciplinary Selection Team reviewed the results of your transplant evaluation.  Based on the results of your evaluation and the selection criteria used by our program , the decision was made to not list you on the kidney transplant list.  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  Important things you should know:    If you would like to discuss the decision, or if your medical status changes you may schedule a return visits with your doctor by calling [phone] and asking to speak to your transplant coordinator.    We recommend that you continue to follow up with your primary care doctor in order to manage your health concerns.  Enclosed is a letter from [employer] which describes the services offered to patients by [facility] and the [facility] Network.  Thank you for allowing us to participate in your care.  We wish you well.  Sincerely,      Lilian Ryder  Solid Organ Transplant  [facility]    Enclosures: OS Letter  cc: Dr. Jay Darden            The [facility]  Toll-free patient services line:     Your resource for organ transplant information    If you have a question regarding your own medical care, you always should call your transplant hospital first. However, for general organ transplant-related information, you can call the Organ Procurement and Transplantation Network (OPTN) toll-free patient services line at [phone]. Anyone, including potential transplant candidates, candidates, recipients, family members, friends, living donors, and donor family members, can call this number to:          Talk about organ donation, living donation, the  transplant process, the donation process, and transplant policies.    Get a free patient information kit with helpful booklets, waiting list and transplant information, and a list of all transplant hospitals.    Ask questions about the OPTN website (https://optn.transplant.hrsa.gov/), the [employer] s (UN[employer]) website (https://unos.org/), or the UNOS website for living donors and transplant recipients. (https://www.transplantliving.org/).    Learn how the OPTN can help you.    Talk about any concerns that you may have with a transplant hospital.    The [facility] transplant system, the OPTN, is managed under federal contract by the [employer] (UNOS), which is a non-profit charitable organization. The OPTN helps create and define organ sharing policies that make the best use of donated organs. This process continuously evaluating new advances and discoveries so policies can be adapted to best serve patients waiting for transplants. To do so, the OPTN works closely with transplant professionals, transplant patients, transplant candidates, donor families, living donors, and the public. All transplant programs and organ procurement organizations throughout the country are OPTN members and are obligated to follow the policies the OPTN creates for allocating organs.    The OPTN also is responsible for:      Providing educational material for patients, the public, and professionals.    Raising awareness of the need for donated organs and tissue.    Coordinating organ procurement, matching, and placement.    Collecting information about every organ transplant and donation that occurs in the [place].    Remember, you should contact your transplant hospital directly if you have questions or concerns about your own medical care including medical records, work-up progress, and test results.    We are not your transplant hospital, and our staff will not be able to answer questions  about your case, so please keep your transplant hospital s phone number handy.    However, while you research your transplant needs and learn as much as you can about transplantation and donation, we welcome your call to our toll-free patient services line at [phone].          Updated 4/1/2019

## 2021-04-06 ENCOUNTER — DOCUMENTATION ONLY (OUTPATIENT)
Dept: HEALTH INFORMATION MANAGEMENT | Facility: CLINIC | Age: 76
End: 2021-04-06